# Patient Record
(demographics unavailable — no encounter records)

---

## 2025-06-28 NOTE — CONSULT LETTER
[Dear  ___] : Dear  [unfilled], [Courtesy Letter:] : I had the pleasure of seeing your patient, [unfilled], in my office today. [Please see my note below.] : Please see my note below. [Consult Closing:] : Thank you very much for allowing me to participate in the care of this patient.  If you have any questions, please do not hesitate to contact me. [Sincerely,] : Sincerely, [FreeTextEntry3] : Wanda Biggs MD [facility] Division of Pediatric Endocrinology  [facility] at \[facility] [place] [facility]\""/[facility]

## 2025-06-28 NOTE — FAMILY HISTORY
[___ inches] : [unfilled] inches [FreeTextEntry5] : 10YO, sister 10YO, Dad had early puberty as well [FreeTextEntry2] : sister 12yo, brother 70 15YO, was an early dayana.

## 2025-06-28 NOTE — CONSULT LETTER
[Dear  ___] : Dear  [unfilled], [Courtesy Letter:] : I had the pleasure of seeing your patient, [unfilled], in my office today. [Please see my note below.] : Please see my note below. [Consult Closing:] : Thank you very much for allowing me to participate in the care of this patient.  If you have any questions, please do not hesitate to contact me. [Sincerely,] : Sincerely, [FreeTextEntry3] : Wanda Biggs MD [facility] Division of Pediatric Endocrinology  [facility] at [facility]

## 2025-06-28 NOTE — PHYSICAL EXAM
[___] : [unfilled] [Healthy Appearing] : healthy appearing [Normal Appearance] : normal appearance [Well formed] : well formed [Normal] : the thyroid was normal [Normal S1 and S2] : normal S1 and S2 [Clear to Ausculation Bilaterally] : clear to auscultation bilaterally [Abdomen Soft] : soft [3] : was Rohith stage 3

## 2025-06-28 NOTE — PHYSICAL EXAM
[___] : [unfilled] [Healthy Appearing] : healthy appearing [Normal Appearance] : normal appearance [Normal] : the thyroid was normal [Well formed] : well formed [Normal S1 and S2] : normal S1 and S2 [Clear to Ausculation Bilaterally] : clear to auscultation bilaterally [Abdomen Soft] : soft [3] : was Rohith stage 3

## 2025-06-28 NOTE — HISTORY OF PRESENT ILLNESS
[Headaches] : no headaches [Polyuria] : no polyuria [Polydipsia] : no polydipsia [Constipation] : no constipation [Cold Intolerance] : no cold intolerance [Heat Intolerance] : no heat intolerance [Fatigue] : no fatigue [Weakness] : no weakness [Anorexia] : no anorexia [Abdominal Pain] : no abdominal pain [FreeTextEntry2] : Tacho is an 11 year 2 month old boy referred for an evaluation of advanced bone age.  His height was between the 50th to the 75th percentile age 3-4 years increased to the 95th percentile by age 6 years and gradually increased slightly above the 95th percentile in the past 2 years. Weight was around the 50th percentile age 3-6 years, increased to 95th percentile by age 7 years and stayed at the 95th percentile until now.    Bone age was read by radiology as closest to 13 years. I think it is even more advanced, between 13 years to 13 year 6 months.  His father reports Tacho started to have pubic hair one year ago and had body odor 2+ years ago. He is very active, playing [employer] and baseball. His diet is mostly healthy.

## 2025-06-28 NOTE — ASSESSMENT
[FreeTextEntry1] :   Tacho is an 11 year 2-month-old boy referred for an evaluation of advanced bone age. Today his height is 63 inches, at the 99th percentile. BMI is at the 86th percentile. On physical exam he is in early-mid puberty. Bone age was read by radiology as closest to 13 years. I think it is even more advanced, between 13 years to 13 year 6 months. HP is 72-73 inches.  I discussed with Tacho and his father and explained he started puberty early, similar to other family members, but since his height prediction is excellent for his genetic potential, there is currently no concern. I explained this is just a prediction and depends how fast he will go through puberty as the bones can advance faster than the chronologic age. He will return for follow up in 5-6 months.

## 2025-06-28 NOTE — ASSESSMENT
[FreeTextEntry1] :   Tacho is an 11 year 2-month-old boy referred for an evaluation of advanced bone age. Today his height is 63 inches, at the 99th percentile. BMI is at the 86th percentile. On physical exam he is in early-mid puberty. Bone age was read by radiology as closest to 13 years. I think it is even more advanced, between 13 years to 13 year 6 months. HP is 72-73 inches.  I discussed with Tacho and his father and explained he started puberty early, similar to other family members, but since his height prediction is excellent for his genetic potential, there is currently no concern. I explained this is just a prediction and depends how fast he will go through puberty as the bones can advance faster than the chronologic age. He will return for follow up in 5-6 months.  3

## 2025-06-28 NOTE — PAST MEDICAL HISTORY
[At Term] : at term [Normal Vaginal Route] : by normal vaginal route [None] : there were no delivery complications [Age Appropriate] : age appropriate developmental milestones met [Occupational Therapy] : occupational therapy [FreeTextEntry1] : normal [FreeTextEntry5] : low muscle tone of hands since 3rd grade

## 2025-06-28 NOTE — FAMILY HISTORY
[___ inches] : [unfilled] inches [FreeTextEntry5] : 12YO, sister 12YO, Dad had early puberty as well [FreeTextEntry2] : sister 14yo, brother 70 17YO, was an early dayana.